# Patient Record
Sex: FEMALE | Race: BLACK OR AFRICAN AMERICAN | Employment: FULL TIME | URBAN - METROPOLITAN AREA
[De-identification: names, ages, dates, MRNs, and addresses within clinical notes are randomized per-mention and may not be internally consistent; named-entity substitution may affect disease eponyms.]

---

## 2018-02-05 LAB
ANTIBODY SCREEN, EXTERNAL: NEGATIVE
CHLAMYDIA, EXTERNAL: NEGATIVE
GRBS, EXTERNAL: NEGATIVE
HBSAG, EXTERNAL: NEGATIVE
HCT, EXTERNAL: 39.1
HEPATITIS C AB,   EXT: NEGATIVE
HGB, EXTERNAL: 12.8
HIV, EXTERNAL: NEGATIVE
N. GONORRHEA, EXTERNAL: NEGATIVE
PLATELET CNT,   EXTERNAL: 315
RPR, EXTERNAL: NEGATIVE
RUBELLA, EXTERNAL: NORMAL
TYPE, ABO & RH, EXTERNAL: NORMAL

## 2018-08-21 LAB — GRBS, EXTERNAL: NEGATIVE

## 2018-09-11 ENCOUNTER — HOSPITAL ENCOUNTER (INPATIENT)
Age: 33
LOS: 1 days | Discharge: HOME | End: 2018-09-12
Attending: OBSTETRICS & GYNECOLOGY | Admitting: OBSTETRICS & GYNECOLOGY
Payer: COMMERCIAL

## 2018-09-11 PROBLEM — D25.9 LEIOMYOMA OF UTERUS: Status: ACTIVE | Noted: 2018-09-11

## 2018-09-11 PROBLEM — R63.8 INCREASED BMI: Status: ACTIVE | Noted: 2018-09-11

## 2018-09-11 PROBLEM — J45.909 ASTHMA: Status: ACTIVE | Noted: 2018-09-11

## 2018-09-11 PROBLEM — O09.899 PRIOR PREGNANCY COMPLICATED BY PIH, ANTEPARTUM: Status: ACTIVE | Noted: 2018-09-11

## 2018-09-11 LAB
ABO + RH BLD: NORMAL
BASOPHILS # BLD: 0 K/UL (ref 0–0.1)
BASOPHILS NFR BLD: 0 % (ref 0–2)
BLOOD GROUP ANTIBODIES SERPL: NORMAL
DIFFERENTIAL METHOD BLD: ABNORMAL
EOSINOPHIL # BLD: 0.2 K/UL (ref 0–0.4)
EOSINOPHIL NFR BLD: 2 % (ref 0–5)
ERYTHROCYTE [DISTWIDTH] IN BLOOD BY AUTOMATED COUNT: 14.5 % (ref 11.6–14.5)
HCT VFR BLD AUTO: 37.2 % (ref 35–45)
HGB BLD-MCNC: 12.7 G/DL (ref 12–16)
LYMPHOCYTES # BLD: 2.1 K/UL (ref 0.9–3.6)
LYMPHOCYTES NFR BLD: 17 % (ref 21–52)
MCH RBC QN AUTO: 27.9 PG (ref 24–34)
MCHC RBC AUTO-ENTMCNC: 34.1 G/DL (ref 31–37)
MCV RBC AUTO: 81.8 FL (ref 74–97)
MONOCYTES # BLD: 0.7 K/UL (ref 0.05–1.2)
MONOCYTES NFR BLD: 6 % (ref 3–10)
NEUTS SEG # BLD: 9.2 K/UL (ref 1.8–8)
NEUTS SEG NFR BLD: 75 % (ref 40–73)
PLATELET # BLD AUTO: 225 K/UL (ref 135–420)
PMV BLD AUTO: 11.1 FL (ref 9.2–11.8)
RBC # BLD AUTO: 4.55 M/UL (ref 4.2–5.3)
SPECIMEN EXP DATE BLD: NORMAL
WBC # BLD AUTO: 12.2 K/UL (ref 4.6–13.2)

## 2018-09-11 PROCEDURE — 86900 BLOOD TYPING SEROLOGIC ABO: CPT | Performed by: ADVANCED PRACTICE MIDWIFE

## 2018-09-11 PROCEDURE — 74011250637 HC RX REV CODE- 250/637: Performed by: ADVANCED PRACTICE MIDWIFE

## 2018-09-11 PROCEDURE — 75410000000 HC DELIVERY VAGINAL/SINGLE

## 2018-09-11 PROCEDURE — 0HQ9XZZ REPAIR PERINEUM SKIN, EXTERNAL APPROACH: ICD-10-PCS | Performed by: OBSTETRICS & GYNECOLOGY

## 2018-09-11 PROCEDURE — 75410000003 HC RECOV DEL/VAG/CSECN EA 0.5 HR

## 2018-09-11 PROCEDURE — 74011250636 HC RX REV CODE- 250/636: Performed by: ADVANCED PRACTICE MIDWIFE

## 2018-09-11 PROCEDURE — 4A1H74Z MONITORING OF PRODUCTS OF CONCEPTION, CARDIAC ELECTRICAL ACTIVITY, VIA NATURAL OR ARTIFICIAL OPENING: ICD-10-PCS | Performed by: OBSTETRICS & GYNECOLOGY

## 2018-09-11 PROCEDURE — 75410000002 HC LABOR FEE PER 1 HR

## 2018-09-11 PROCEDURE — 74011250637 HC RX REV CODE- 250/637: Performed by: OBSTETRICS & GYNECOLOGY

## 2018-09-11 PROCEDURE — 65270000029 HC RM PRIVATE

## 2018-09-11 PROCEDURE — 85025 COMPLETE CBC W/AUTO DIFF WBC: CPT | Performed by: ADVANCED PRACTICE MIDWIFE

## 2018-09-11 RX ORDER — SALICYLIC ACID
90 POWDER (GRAM) MISCELLANEOUS ONCE
Status: COMPLETED | OUTPATIENT
Start: 2018-09-11 | End: 2018-09-11

## 2018-09-11 RX ORDER — AMOXICILLIN 250 MG
1 CAPSULE ORAL
Status: CANCELLED | OUTPATIENT
Start: 2018-09-11

## 2018-09-11 RX ORDER — ACETAMINOPHEN 325 MG/1
650 TABLET ORAL
Status: DISCONTINUED | OUTPATIENT
Start: 2018-09-11 | End: 2018-09-12 | Stop reason: HOSPADM

## 2018-09-11 RX ORDER — METHYLERGONOVINE MALEATE 0.2 MG/ML
0.2 INJECTION INTRAVENOUS AS NEEDED
Status: DISCONTINUED | OUTPATIENT
Start: 2018-09-11 | End: 2018-09-11 | Stop reason: HOSPADM

## 2018-09-11 RX ORDER — AMOXICILLIN 250 MG
1 CAPSULE ORAL
Status: DISCONTINUED | OUTPATIENT
Start: 2018-09-11 | End: 2018-09-12 | Stop reason: HOSPADM

## 2018-09-11 RX ORDER — ACETAMINOPHEN 325 MG/1
650 TABLET ORAL
Status: CANCELLED | OUTPATIENT
Start: 2018-09-11

## 2018-09-11 RX ORDER — NALBUPHINE HYDROCHLORIDE 10 MG/ML
10 INJECTION, SOLUTION INTRAMUSCULAR; INTRAVENOUS; SUBCUTANEOUS
Status: DISCONTINUED | OUTPATIENT
Start: 2018-09-11 | End: 2018-09-11 | Stop reason: HOSPADM

## 2018-09-11 RX ORDER — OXYTOCIN/0.9 % SODIUM CHLORIDE 30/500 ML
0-25 PLASTIC BAG, INJECTION (ML) INTRAVENOUS
Status: DISCONTINUED | OUTPATIENT
Start: 2018-09-11 | End: 2018-09-11

## 2018-09-11 RX ORDER — MISOPROSTOL 200 UG/1
800 TABLET ORAL
Status: COMPLETED | OUTPATIENT
Start: 2018-09-11 | End: 2018-09-11

## 2018-09-11 RX ORDER — LIDOCAINE HYDROCHLORIDE 10 MG/ML
20 INJECTION, SOLUTION EPIDURAL; INFILTRATION; INTRACAUDAL; PERINEURAL AS NEEDED
Status: COMPLETED | OUTPATIENT
Start: 2018-09-11 | End: 2018-09-11

## 2018-09-11 RX ORDER — OXYTOCIN/RINGER'S LACTATE 20/1000 ML
125 PLASTIC BAG, INJECTION (ML) INTRAVENOUS CONTINUOUS
Status: DISCONTINUED | OUTPATIENT
Start: 2018-09-11 | End: 2018-09-11 | Stop reason: HOSPADM

## 2018-09-11 RX ORDER — PROMETHAZINE HYDROCHLORIDE 25 MG/ML
25 INJECTION, SOLUTION INTRAMUSCULAR; INTRAVENOUS
Status: DISCONTINUED | OUTPATIENT
Start: 2018-09-11 | End: 2018-09-12 | Stop reason: HOSPADM

## 2018-09-11 RX ORDER — OXYTOCIN/RINGER'S LACTATE 20/1000 ML
500 PLASTIC BAG, INJECTION (ML) INTRAVENOUS ONCE
Status: COMPLETED | OUTPATIENT
Start: 2018-09-11 | End: 2018-09-11

## 2018-09-11 RX ORDER — IBUPROFEN 400 MG/1
800 TABLET ORAL
Status: CANCELLED | OUTPATIENT
Start: 2018-09-11

## 2018-09-11 RX ORDER — SODIUM CHLORIDE, SODIUM LACTATE, POTASSIUM CHLORIDE, CALCIUM CHLORIDE 600; 310; 30; 20 MG/100ML; MG/100ML; MG/100ML; MG/100ML
125 INJECTION, SOLUTION INTRAVENOUS CONTINUOUS
Status: DISCONTINUED | OUTPATIENT
Start: 2018-09-11 | End: 2018-09-11 | Stop reason: HOSPADM

## 2018-09-11 RX ORDER — HYDROCORTISONE 25 MG/G
CREAM TOPICAL AS NEEDED
Status: DISCONTINUED | OUTPATIENT
Start: 2018-09-11 | End: 2018-09-12 | Stop reason: HOSPADM

## 2018-09-11 RX ORDER — IBUPROFEN 400 MG/1
800 TABLET ORAL
Status: DISCONTINUED | OUTPATIENT
Start: 2018-09-11 | End: 2018-09-12 | Stop reason: HOSPADM

## 2018-09-11 RX ORDER — TERBUTALINE SULFATE 1 MG/ML
0.25 INJECTION SUBCUTANEOUS
Status: DISCONTINUED | OUTPATIENT
Start: 2018-09-11 | End: 2018-09-11 | Stop reason: HOSPADM

## 2018-09-11 RX ORDER — PROMETHAZINE HYDROCHLORIDE 25 MG/ML
25 INJECTION, SOLUTION INTRAMUSCULAR; INTRAVENOUS
Status: CANCELLED | OUTPATIENT
Start: 2018-09-11

## 2018-09-11 RX ORDER — OXYCODONE AND ACETAMINOPHEN 5; 325 MG/1; MG/1
2 TABLET ORAL
Status: DISCONTINUED | OUTPATIENT
Start: 2018-09-11 | End: 2018-09-12 | Stop reason: HOSPADM

## 2018-09-11 RX ADMIN — IBUPROFEN 800 MG: 400 TABLET ORAL at 15:58

## 2018-09-11 RX ADMIN — Medication 10000 MILLI-UNITS/HR: at 05:15

## 2018-09-11 RX ADMIN — SODIUM CHLORIDE, SODIUM LACTATE, POTASSIUM CHLORIDE, AND CALCIUM CHLORIDE 125 ML/HR: 600; 310; 30; 20 INJECTION, SOLUTION INTRAVENOUS at 02:45

## 2018-09-11 RX ADMIN — Medication 5 ML: at 05:16

## 2018-09-11 RX ADMIN — MISOPROSTOL 400 MCG: 200 TABLET ORAL at 05:09

## 2018-09-11 RX ADMIN — OXYTOCIN 6 MILLI-UNITS/MIN: 10 INJECTION, SOLUTION INTRAMUSCULAR; INTRAVENOUS at 04:55

## 2018-09-11 RX ADMIN — OXYTOCIN 2 MILLI-UNITS/MIN: 10 INJECTION, SOLUTION INTRAMUSCULAR; INTRAVENOUS at 03:47

## 2018-09-11 RX ADMIN — CASTOR OIL 90 ML: 1 LIQUID ORAL at 04:48

## 2018-09-11 RX ADMIN — IBUPROFEN 800 MG: 400 TABLET ORAL at 07:49

## 2018-09-11 NOTE — PROGRESS NOTES
0700 Received report from [id number] Emma Masters rn to oncoming nurse william Knapp Encouraged rest and shower this afternoon, baby at bedside in fathers arms. Declines any needs. 1130 Patient resting declines any needs.

## 2018-09-11 NOTE — L&D DELIVERY NOTE
Delivery Summary  Called to room by RN for pt reporting urge to push. AROM clear fluid, 9.5/100/0. Maternal pushing efforts initially inadequate to move baby, pitocin started to augment labor. Spontaneous, controlled vaginal birth of vigorous male infant. Head delivered CHRIS position, no nuchal cord, and clear fluid. Nuchal hand noted at posterior. Body delivered without difficulty. Atkinson to mothers abdomen. After placental autotransfusion, cord was doubly clamped and cut by father. Placenta delivered spontaneously and intact via Ruddy Weir, with 3-vessel, centrally inserted cord. IV Pitocin and cytotec 400mcg MA administered. Fundus firm, with minimal bleeding. Perineum and vagina inspected and found to have first degree perineal laceration repaired under local anesthesia with 3-0 vicryl suture and resulting in good hemostasis. EBL 350cc. Mother and infant stable, skin to skin, recovering in LDR. HERIBERTO Orourke present at birth. Dr. Ananda Mello notified of this birth. Patient: Jamar Randall MRN: [id number]  SSN: [id number]    YOB: 1985  Age: 28 y.o.   Sex: female        Labor Events:    Labor: No    Rupture Date: 2018    Rupture Time: 3:23 AM    Rupture Type: AROM    Amniotic Fluid Volume:       Amniotic Fluid Description:  Amniotic Fluid Odor: Clear          Induction: None         Induction Date:        Induction Time:       Indications for Induction:       Augmentation: None    Augmentation Date:      Augmentation Time:      Indications for Augmentation:      Cervical Ripening:       None    Rupture Identifier:       Labor complications: None     Additional complications:           Delivery Events:  Episiotomy: None    Indications for Episiotomy:      Laceration(s): First degree perineal       Repaired: Yes     Number of Repair Packets: 1    Suture Type and Size: Vicryl 3-0        Estimated Blood Loss (ml): 350        Information for the patient's :  REN Jaramillo Alethea [[id number]]     Delivery Summary - Baby    Delivery Date: 2018   Delivery Time: 4:53 AM   Delivery Type: Vaginal, Spontaneous Delivery  Sex:  male  Gestational Age: 38w3d  Delivery Clinician:  Lai Bustillo  Living?: Living   Delivery Location: L&D             APGARS  One minute Five minutes Ten minutes   Skin Color: 0    1       Heart Rate: 2   2         Reflex Irritability: 2   2         Muscle Tone: 2   2       Respiration: 2   2         Total: 8   9           Presentation: Vertex  Position:   Occiput Anterior  Resuscitation Method:  Tactile Stimulation     Meconium Stained: None    Cord Information: 3 Vessels   Complications: None  Cord Blood Sent?:  Yes    Blood Gases Sent?:  No    Placenta:  Date/Time:   5:13 AM  Removal: Spontaneous      Appearance: Normal      Measurements:  Birth Weight:      Birth Length:     Head Circumference:       Chest Circumference:      Abdominal Girth:       Other Providers:   BIA BLOOD;GABBY POLK;Elizabeth COLEMAN Primary Nurse;Primary  Nurse;Midwife;Nursery Nurse           Cord Blood Results:  Information for the patient's :  REN Jaramillo [[id number]]   No results found for: Duane De La Cruz, PCTDIG, BILI, ABORHEXT, [address]    Information for the patient's :  REN Jaramillo [[id number]]   No results found for: APH, APCO2, APO2, AHCO3, ABEC, ABDC, O2ST, SITE, RSCOM, PHI, PCO2I, PO2I, HCO3I, SO2I, IBD     Information for the patient's :  REN Jaramillo [[id number]]   No results found for: EPHV, PCO2V, PO2V, HCO3V, O2STV, EBDV

## 2018-09-11 NOTE — H&P
History & Physical 
 
Name: Mathieu Dumont MRN: [id number]  SSN: [id number] YOB: 1985  Age: 28 y.o. Sex: female Subjective:  
 
Estimated Date of Delivery: None noted. OB History  Para Term  AB Living  
 1 SAB TAB Ectopic Molar Multiple Live Births OB HISTORY Alethea presents with contractions which started yesterday morning and became more intense overnight. She denies LOF or VB. +FM. She desires an epidural in labor. Ms. Veronika Strickland is admitted with pregnancy at Unknown for active labor. Prenatal course was complicated by increased BMI and hx of PIH. She was induced in her first pregnancy at [address] [facility], and after an 18 hour labor, had VAD. Prenatal care has been followed by [facility] [address] [place] [address] starting at 7+6wga. Total wt gain 23lbs. Admitted to [phone]. No past medical history on file. No past surgical history on file. Social History Occupational History  Not on file. Social History Main Topics  Smoking status: Never Smoker  Smokeless tobacco: Not on file  Alcohol use No  
 Drug use: No  
 Sexual activity: Not on file No family history on file. No Known Allergies Prior to Admission medications Not on File Review of Systems: Pertinent items are noted in HPI. Objective:  
 
Vitals: There were no vitals filed for this visit. Physical Exam: 
Patient in no acute distress Abdomen: Gravid, nontender Cervix: 8/100 %/-2/  
FHR:Baseline: 135 per minute Variability: moderate Accelerations: no 
Decelerations: none Contractions: Every 3 minutes EFW 8.5 # by Leopold's 
 
Prenatal Labs:  
A pos, Neg prenatal panel. Group B Strep was negative. Assessment/Plan:  
Assessment: IUP @ 39+3wga; FHT Category I; Vertex presentation Patient Active Problem List  
Diagnosis Code  Labor and delivery indication for care or intervention O75.9  
 Increased BMI R63.8  Prior pregnancy complicated by [facility], antepartum O09.899  Asthma J45.909  Leiomyoma of uterus D25.9 Plan: Admit for Labor. Continue expectant management, Continue plan for vaginal delivery. Epidural as desired. Anticipate . Dr. Ros Hwang notified. Signed By:  Valentina Gaviria CNM  2018 2:50 AM

## 2018-09-11 NOTE — LACTATION NOTE
Dad states mom has tried twice to nurse baby. Mom in restroom. Instructed dad on nursing pattern/expectations. Baby is about 8 hours old. Mom did not nurse her first child. Offered help with feedings, encouraged to call when baby ready to eat. Info sheet, daily log and resource list given.

## 2018-09-11 NOTE — IP AVS SNAPSHOT
[address] Patient: Alfonzo Morales MRN: [id number] About your hospitalization You were admitted on:  September 11, 2018 You last received care in the:  [place] [address] You were discharged on:  September 12, 2018 Why you were hospitalized Your primary diagnosis was:  Labor And Delivery Indication For Care Or Intervention Your diagnoses also included:  Increased Bmi, Prior Pregnancy Complicated By Pih, Antepartum, Asthma, Leiomyoma Of Uterus Follow-up Information Follow up With Details Comments Contact Info Melissa Dodd II, MD   [address] [place] [address] 220 [facility] [phone] 
[phone] Discharge Orders None A check remington indicates which time of day the medication should be taken. My Medications START taking these medications Instructions Each Dose to Equal  
 Morning Noon Evening Bedtime  
 hydrocortisone 2.5 % rectal cream  
Commonly known as:  ANUSOL-HC Your last dose was: Your next dose is:    
   
   
 Apply externally 4 x /day as needed. ibuprofen 800 mg tablet Commonly known as:  MOTRIN Your last dose was: Your next dose is: Take 1 Tab by mouth every eight (8) hours as needed. 800 mg CONTINUE taking these medications Instructions Each Dose to Equal  
 Morning Noon Evening Bedtime PRENATAL AD PO Your last dose was: Your next dose is: Take 1 Tab by mouth daily. 1 Tab Where to Get Your Medications Information on where to get these meds will be given to you by the nurse or doctor. ! Ask your nurse or doctor about these medications  
  hydrocortisone 2.5 % rectal cream  
 ibuprofen 800 mg tablet Discharge Instructions CONGRATULATIONS ON THE BIRTH OF YOUR BABY! The first six weeks after childbirth is a time of physical and emotional adjustment. This handout will help to answer questions and provide guidance during the postpartum period. Every family's adjustment is unique, so please call if you have further concerns. At anytime we can be reached at [phone]. During office hours please ask to speak to a charge nurse. After hours, the answering service will take a message and the Nurse-Midwife on-call will return your call. If your question can wait until office hours: Monday-Friday 8:30-4:00, please do so. For emergencies or urgent concerns do not hesitate to call us after hours. DIET Your body is in need of a well-balanced, high protein diet to recuperate from birth. Please continue to take your prenatal vitamins for 6 weeks or as long as you are breastfeeding. Continue to drink at least 6-8 cups of water or other liquid a day. A breastfeeding mother also needs extra protein, calories and calcium containing foods. It is a good rule to drink fluids with every feeding in order to maintain an adequate milk supply and avoid dehydration. Your baby will probably not be bothered by things in your diet, but if the baby seems extremely fussy or develops a rash, you may want to discuss possible food intolerances with your baby's care provider. PAIN MEDICATIONS Acetaminophen (Tylenol), ibuprofen (Motrin), or other prescribed pain medication may be taken as directed to relieve discomfort. The above medications pass in very minimal amounts into the breast milk and usually will not cause problems. There are medications that may affect the baby, so please consult your baby's care provider before taking medication. If you are breastfeeding, be sure to mention this to any care provider you see so that medications that are safe may be selected.   There is an excellent resource called [employer] that is a resource for medication safety in pregnancy and lactation. You can visit their website at Bettyvision/ or call them toll free at [phone] if you have any questions about medication safety. UTERINE INVOLUTION / VAGINAL BLEEDING Involution is the process of the uterus returning to pre-pregnant size. It will take approximately six weeks for this process to occur. To achieve this size your uterus becomes firm to slow bleeding loss from the placental site. The first 7 days after birth, the bleeding is red and heavy. It may change with your activity and position. Some small clots are normal.   After ten days, the bleeding should be pale pink and slowed considerably. The next several weeks may progress to a pink, mucousy discharge. This may continue for 6-8 weeks, depending on your activity. During the first four weeks after delivery we recommend using sanitary pads instead of tampons. Douching should also be avoided, but it is fine to take a tub bath so long as the tub is very clean. ACTIVITY/EXERCISE Adequate rest is essential to recovery. Try to rest or sleep when the baby sleeps. After two weeks, you may begin going for short walks, doing Kegel exercises and abdominal crunches. Avoid heavy, jarring or aerobic exercises. Remember to start out slowly and build up to your previous fitness level. Use common sense and don't overdo as rest is important and the benefits of increased rest are a quicker recovery. For the first two weeks after a  try to limit trips up or down steps. Do not lift anything heavier than the baby during this time. Lifting the baby or other objects should be done by bending at the knees rather than the waist.  Driving should be avoided during the first two to three weeks until you have the strength to push firmly on the brakes in case of an emergency.   You may ride as a passenger, but DO wear a seat belt at all times. After a few weeks, you may resume normal activity at whatever pace is comfortable for you. Exercise may also be resumed gradually. Walking is a good way to start. Finally, try to be reasonable in your expectations. Caring for a new baby after major surgery can be quite trying. Arrange for assistance at home to ensure that you get enough rest.  
 
POSTPARTUM CHECK You may call the office when you return home to set up a postpartum visit. Most patients will be seen at 6 weeks after delivery, but after a  or other circumstances you may be seen in 2 weeks or less. If you are discharged from the hospital with staples that must be removed, you will be asked to come in sooner. At your postpartum visit, a pelvic exam may be performed. If you are having any problems or concerns, please do not hesitate to call. Once again our number is [phone]. MOOD CHANGES Significant hormonal changes occur in the days following delivery, and as a result, many women experience brief episodes of tearfulness or feeling \"blue. \"  These emotional swings may be made worse by lack of sleep and by the adjustments inherent in becoming a mother. For some women, these fluctuations are minor. For others, they are overwhelming; creating feelings of anxiety, depression, or the inability to cope. If you have difficulty functioning as a result of feeling down, or if the mood changes seem severe, do not improve, or result is thoughts of harming yourself or others CALL RIGHT AWAY. PERINEAL CARE The basic goals of perineal care are to prevent infection, to relieve pain and promote healing. Your stitches will dissolve in four to six weeks, and do not need to be removed. After urinating, please continue to clean with warm water from front to back. Please continue sitz baths as instructed twice a day for a week or as needed.   Call the office if you see pus in the suture site, or have unusual or severe swelling or pain that seems to be getting worse. INCISION CARE If you had a , clean and dry the incision gently as you would the rest of your body. Washing over the area with soap and water, and showering are fine. If steri-strips are present they will gradually come off with time. Tub baths are permitted. You may experience numbness and burning in the area surrounding the incision which usually resolves gradually over the next several weeks or months. RETURN OF MENSTRUATION Your first menstrual period may occur as soon as four to six weeks after your delivery if you are not breast-feeding. If breast-feeding it is more difficult to predict when your first period will occur. Even if you are not yet menstruating, you may be ovulating and it may be possible to conceive again. It is common for your first period after childbirth to be very heavy with an increased amount of cramping. BREASTS Breast-feeding Mothers: Colostrum is excreted in the first 24-72 hours. Mature breast milk will appear on the 2nd to 5th day. Engorgement may occur with the mature milk making your breasts feel warm and very full. Frequent feedings will make you more comfortable. Babies do not nurse on regular schedules. Nursing every 1 1/2 to 2 hours is normal and frequent feeding DOES NOT mean you are not making enough milk. To avoid nipple confusion, do not give bottles for the first 4 weeks. Growth spurts are common and may require more frequent feedings. This is the way baby increases your milk supply. During a growth spurt, you may feel you are feeding very frequently and that your breasts are \"empty. \"  Don't worry, your milk is produced by supply and demand so this increased frequency of feeding will increase your milk supply within 48 hours.   Sore nipples may occur with frequent feedings and are sometimes also caused by improper latch.  Check for a proper latch. Baby should have a wide open mouth. Use different positions at each feeding if possible. Express a small amount of colostrum or breast milk onto the sore area and leave bra flaps unlatched until dry. The lactation consultant at [facility] is available for outpatient consultation without charge. Call [phone] from Monday-Friday 9:00am- 3:00pm to arrange an outpatient appointment with her. [facility] and consultants may also be very helpful. If You Are Not Breast-feeding: You will experience swelling, engorgement and some milk production. There are no safe medications available to stop lactation. Some remedies for engorgement include: wearing a tight bra, ice packs and cold green cabbage leaves placed between the breast and your bra. Change these frequently. Tylenol or Motrin should help with the discomfort. SEXUAL ADJUSTMENTS We recommend that you wait at least four weeks before resuming sexual intercourse. A sore perineum, a demanding baby and fatigue will certainly affect your ability to enjoy lovemaking! A vaginal lubricant is recommended to help with any dryness. It is very important to remember that you will ovulate BEFORE your first period and can conceive. If you do not wish another pregnancy right away, please take precautions to avoid pregnancy. If you would like a prescription method of birth control, please discuss this with us at your 6 week visit. ELIMINATION We remind all postpartum patients that it may take a few days for your bowels to return to normal, especially if you had a long labor. For those who had C-sections or severe lacerations, we recommend that you use a stool softener twice daily for at least two weeks. Many stool softeners are over-the-counter. Colace (Docusate Sodium) is recommended.   Bulk forming agents such as Metamucil or Fibercon may be used daily in addition to a stool softener to promote regular bowel movements. Eating fresh fruits and vegetables along with whole grains is helpful as well. Do not be afraid to have a bowel movement as your stitches will not \"come out\" in the course of having a bowel movement. Urination may be difficult due to soreness around the urethra, or as an after effect of epidural.  This is temporary and can be helped  by squirting water over the perineum or try going in the shower. Hemorrhoids are common after birth. Tucks pads, Anusol cream and avoiding constipation are helpful. If constipation does occur, you may take Milk of Magnesia or Senekot according to the package instructions. DANGER SIGNS! CALL WITHOUT DELAY IF YOU ARE EXPERIENCING ANY OF THE FOLLOWING: 
* Unusually heavy bleeding, soaking more than 1 or more pads in an hour. * Vaginal discharge with strong foul odor. * Fever of 101 or higher * Unusual pain or tenderness in the abdominal area. * If breasts are red, hot or have a painful lump. * Depression that persists longer than 1-2 weeks or is severe. * Any urinary frequency accompanied by urgency or pain. * A lump in leg or calf especially if painful, warm or red. We thank you for choosing us for your prenatal care and/or delivery. We wish you all happiness and health with your baby for his or her lifetime! Jimi Godinez CNM Introducing \[facility] [place] [facility]\"" & HEALTH SERVICES! [employer] introduces Alum.ni patient portal. Now you can access parts of your medical record, email your doctor's office, and request medication refills online. 1. In your internet browser, go to [email or web address] 2. Click on the First Time User? Click Here link in the Sign In box. You will see the New Member Sign Up page. 3. Enter your Alum.ni Access Code exactly as it appears below. You will not need to use this code after youve completed the sign-up process.  If you do not sign up before the expiration date, you must request a new code. · K[employer]uya Access Code: [email or web address] Expires: 12/11/2018 12:35 PM 
 
4. Enter the last four digits of your Social Security Number (xxxx) and Date of Birth (mm/dd/yyyy) as indicated and click Submit. You will be taken to the next sign-up page. 5. Create a Kumbuya ID. This will be your Kumbuya login ID and cannot be changed, so think of one that is secure and easy to remember. 6. Create a Kumbuya password. You can change your password at any time. 7. Enter your Password Reset Question and Answer. This can be used at a later time if you forget your password. 8. Enter your e-mail address. You will receive e-mail notification when new information is available in [address] 9. Click Sign Up. You can now view and download portions of your medical record. 10. Click the Download Summary menu link to download a portable copy of your medical information. If you have questions, please visit the Frequently Asked Questions section of the Kumbuya website. Remember, Kumbuya is NOT to be used for urgent needs. For medical emergencies, dial 911. Now available from your iPhone and Android! Introducing Srinivas Serrano As a [facility] patient, I wanted to make you aware of our electronic visit tool called Srinivas Serrano. [facility] 24/7 allows you to connect within minutes with a medical provider 24 hours a day, seven days a week via a mobile device or tablet or logging into a secure website from your computer. You can access Srinivas Serrano from anywhere in the [place].  
 
A virtual visit might be right for you when you have a simple condition and feel like you just dont want to get out of bed, or cant get away from work for an appointment, when your regular [facility] provider is not available (evenings, weekends or holidays), or when youre out of town and need minor care. Electronic visits cost only $49 and if the [employer] provider determines a prescription is needed to treat your condition, one can be electronically transmitted to a nearby pharmacy*. Please take a moment to enroll today if you have not already done so. The enrollment process is free and takes just a few minutes. To enroll, please download the [employer] abraham to your tablet or phone, or visit [email or web address] to enroll on your computer. And, as an [address] [place] [address] patient with a TE[employer] account, the results of your visits will be scanned into your electronic medical record and your primary care provider will be able to view the scanned results. We urge you to continue to see your regular Nehemias Saenz provider for your ongoing medical care. And while your primary care provider may not be the one available when you seek a [employer] virtual visit, the peace of mind you get from getting a real diagnosis real time can be priceless. For more information on [employer], view our Frequently Asked Questions (FAQs) at [email or web address]. Sincerely, 
 
Owen Joshi MD 
Chief Medical Officer [address] *:  certain medications cannot be prescribed via [employer] Providers Seen During Your Hospitalization Provider Specialty Primary office phone Le Velez MD Obstetrics & Gynecology [phone] Your Primary Care Physician (PCP) Primary Care Physician Office Phone Office Fax Paulina Queen [phone] You are allergic to the following No active allergies Recent Documentation Height Weight Breastfeeding? BMI OB Status Smoking Status 1.702 m 133.8 kg Unknown 46.2 kg/m2 Recent pregnancy Never Smoker Emergency Contacts Name Discharge Info Relation Home Work Mobile [place] CAREGIVER [3] Spouse [3]   [phone] Patient Belongings The following personal items are in your possession at time of discharge: 
  Dental Appliances: None  Visual Aid: Glasses      Home Medications: None   Jewelry: None  Clothing: Dress, Footwear, Undergarments    Other Valuables: Jenaro Seymour Discharge Instructions Attachments/References DEPRESSION: POSTPARTUM (ENGLISH) PARENTING: STRESS AND INFANTS (ENGLISH) POSTPARTUM (ENGLISH) BABY-FRIENDLY BREASTFEEDING: GENERAL INFO (ENGLISH) FEEDING: : PEDIATRIC (ENGLISH) Patient Handouts Depression After Childbirth: Care Instructions Your Care Instructions Many women get the \"baby blues\" during the first few days after childbirth. You may lose sleep, feel irritable, and cry easily. You may feel happy one minute and sad the next. Hormone changes are one cause of these emotional changes. Also, the demands of a new baby, along with visits from relatives or other family needs, add to a mother's stress. The \"baby blues\" often peak around the fourth day. Then they ease up in less than 2 weeks. If your moodiness or anxiety lasts for more than 2 weeks, or if you feel like life is not worth living, you may have postpartum depression. This is different for each mother. Some mothers with serious depression may worry intensely about their infant's well-being. Others may feel distant from their child. Some mothers might even feel that they might harm their baby. A mother may have signs of paranoia, wondering if someone is watching her. Depression is not a sign of weakness. It is a medical condition that requires treatment. Medicine and counseling often work well to reduce depression. Talk to your doctor about taking antidepressant medicine while breastfeeding. Follow-up care is a key part of your treatment and safety.  Be sure to make and go to all appointments, and call your doctor if you are having problems. It's also a good idea to know your test results and keep a list of the medicines you take. How do you know if you are depressed? With all the changes in your life, you may not know if you are depressed. Pregnancy sometimes causes changes in how you feel that are similar to the symptoms of depression. Symptoms of depression include: · Feeling sad or hopeless and losing interest in daily activities. These are the most common symptoms of depression. · Sleeping too much or not enough. · Feeling tired. You may feel as if you have no energy. · Eating too much or too little. · Writing or talking about death, such as writing suicide notes or talking about guns, knives, or pills. Keep the numbers for these national suicide hotlines: 6-469-826-TALK (3-814.670.5615) and 8-169-LNGEBLX (7-111.279.8131). If you or someone you know talks about suicide or feeling hopeless, get help right away. How can you care for yourself at home? · Be safe with medicines. Take your medicines exactly as prescribed. Call your doctor if you think you are having a problem with your medicine. · Eat a healthy diet so that you can keep up your energy. · Get regular daily exercise, such as walks, to help improve your mood. · Get as much sunlight as possible. Keep your shades and curtains open. Get outside as much as you can. · Avoid using alcohol or other substances to feel better. · Get as much rest and sleep as possible. Avoid doing too much. Being too tired can increase depression. · Play stimulating music throughout your day and soothing music at night. · Schedule outings and visits with friends and family. Ask them to call you regularly, so that you do not feel alone. · Ask for help with preparing food and other daily tasks. Family and friends are often happy to help a mother with a . · Be honest with yourself and those who care about you. Tell them about your struggle. · Join a support group of new mothers. No one can better understand the challenges of caring for a  than other new mothers. · If you feel like life is not worth living or are feeling hopeless, get help right away. Keep the numbers for these national suicide hotlines: 6-764-128-TALK (3-457.856.6348) and 9-186-GPLXXPZ (1-691.176.6465). When should you call for help? Call 911 anytime you think you may need emergency care. For example, call if: 
  · You feel you cannot stop from hurting yourself, your baby, or someone else.  
[facility] your doctor now or seek immediate medical care if: 
  · You are having trouble caring for yourself or your baby.  
  · You hear voices.  
Darline Enamorado closely for changes in your health, and be sure to contact your doctor if: 
  · You have problems with your depression medicine.  
  · You do not get better as expected. Where can you learn more? Go to http://denilson-qiana.info/. Enter A576 in the search box to learn more about \"Depression After Childbirth: Care Instructions. \" Current as of: 2017 Content Version: 11.7 © 9541-1477 [employer]. Care instructions adapted under license by [employer] (which disclaims liability or warranty for this information). If you have questions about a medical condition or this instruction, always ask your healthcare professional. [place] [address] any warranty or liability for your use of this information. Stress in Parents of Infants: Care Instructions Your Care Instructions Meeting the increased demands of being a new parent can be a big challenge. It is easy to get overtired and overwhelmed during the first weeks. What used to be a simple chore, such as buying groceries, is not so simple now. Plus, you have new chores, including feeding and changing your new baby.  At the end of the day, you may be so tired that you feel like crying. Instead of looking forward to the next day, you may be dreading tomorrow. Like many new parents, you are burned out from the stress of having a new baby. Stress affects each of us differently, and the most effective ways to relieve it are different for each person. You can try different methods to find out which ones work best for you. As the weeks go by, you will begin to develop a rhythm with your baby. Tasks that now seem to take forever will become easier. Many women get the \"baby blues\" during the first few days after childbirth. If you are a new mother and the \"baby blues\" last more than a few days, call your doctor right away. Depression is a medical condition that requires treatment. Follow-up care is a key part of your treatment and safety. Be sure to make and go to all appointments, and call your doctor if you are having problems. It's also a good idea to know your test results and keep a list of the medicines you take. How can you care for yourself at home? · Be kind to yourself. Your new baby takes a lot of work, but he or she can give you a lot of pleasure too. Do not worry about housekeeping for a while. · Allow your friends to bring you meals or do chores. · Limit visitors to as few as you feel you can handle, or ask them not to visit for a while. Before they come, set a limit on how long they will stay. · Sleep when your baby sleeps. Even a short nap helps. · Find what triggers your stress, and avoid those things as much as you can. MCS comment for future enhancement · If you breastfeed, learn how to collect and store some breast milk so your partner or  can feed the baby while you sleep. · Eat a balanced diet so you can keep up your energy. · Drink plenty of fluids throughout the day. · Avoid caffeine and alcohol. Caffeine is found in coffee, tea, cola drinks, chocolate, and other foods.  
· Limit medicines that can make you more tired, such as tranquilizers and cold and allergy medicines. · Get regular daily exercise, such as walks, to help improve your mood. Rest after you exercise. · Be honest with yourself and those who care about you. Tell them you are stressed and tired. · Talking to other new parents can help. Ask your doctor or child's doctor to suggest support groups for new parents. Hearing that someone else is having the same experiences you are can help a lot. · If you have the baby blues for more than a few days, call your doctor right away. When should you call for help? Call 911 anytime you think you may need emergency care. For example, call if: 
  · You have thoughts of hurting yourself, your baby, or another person.  
[facility] your doctor now or seek immediate medical care if: 
  · You are having trouble caring for yourself or your baby.  
[place] closely for changes in your health, and be sure to contact your doctor if you have any problems. Where can you learn more? Go to http://denilson-qiana.info/. Enter H142 in the search box to learn more about \"Stress in Parents of Infants: Care Instructions. \" Current as of: May 12, 2017 Content Version: 11.7 © 8262-0170 [employer]. Care instructions adapted under license by [employer] (which disclaims liability or warranty for this information). If you have questions about a medical condition or this instruction, always ask your healthcare professional. [place] [address] any warranty or liability for your use of this information. After Your Delivery (the Postpartum Period): Care Instructions Your Care Instructions Congratulations on the birth of your baby. Like pregnancy, the  period can be a time of excitement, chu, and exhaustion. You may look at your wondrous little baby and feel happy. You may also be overwhelmed by your new sleep hours and new responsibilities. At first, babies often sleep during the days and are awake at night. They do not have a pattern or routine. They may make sudden gasps, jerk themselves awake, or look like they have crossed eyes. These are all normal, and they may even make you smile. In these first weeks after delivery, try to take good care of yourself. It may take 4 to 6 weeks to feel like yourself again, and possibly longer if you had a  birth. You will likely feel very tired for several weeks. Your days will be full of ups and downs, but lots of chu as well. Follow-up care is a key part of your treatment and safety. Be sure to make and go to all appointments, and call your doctor if you are having problems. It's also a good idea to know your test results and keep a list of the medicines you take. How can you care for yourself at home? Take care of your body after delivery · Use pads instead of tampons for the bloody flow that may last as long as 2 weeks. · Ease cramps with ibuprofen (Advil, Motrin). · Ease soreness of hemorrhoids and the area between your vagina and rectum with ice compresses or witch hazel pads. · Ease constipation by drinking lots of fluid and eating high-fiber foods. Ask your doctor about over-the-counter stool softeners. · Cleanse yourself with a gentle squeeze of warm water from a bottle instead of wiping with toilet paper. · Take a sitz bath in warm water several times a day. · Wear a good nursing bra. Ease sore and swollen breasts with warm, wet washcloths. · If you are not breastfeeding, use ice rather than heat for breast soreness. · Your period may not start for several months if you are breastfeeding. You may bleed more, and longer at first, than you did before you got pregnant. · Wait until you are healed (about 4 to 6 weeks) before you have sexual intercourse. Your doctor will tell you when it is okay to have sex. · Try not to travel with your baby for 5 or 6 weeks.  If you take a long car trip, make frequent stops to walk around and stretch. Avoid exhaustion · Rest every day. Try to nap when your baby naps. · Ask another adult to be with you for a few days after delivery. · Plan for  if you have other children. · Stay flexible so you can eat at odd hours and sleep when you need to. Both you and your baby are making new schedules. · Plan small trips to get out of the house. Change can make you feel less tired. · Ask for help with housework, cooking, and shopping. Remind yourself that your job is to care for your baby. Know about help for postpartum depression · \"Baby blues\" are common for the first 1 to 2 weeks after birth. You may cry or feel sad or irritable for no reason. · Rest whenever you can. Being tired makes it harder to handle your emotions. · Go for walks with your baby. · Talk to your partner, friends, and family about your feelings. · If your symptoms last for more than a few weeks, or if you feel very depressed, ask your doctor for help. · Postpartum depression can be treated. Support groups and counseling can help. Sometimes medicine can also help. Stay healthy · Eat healthy foods so you have more energy, make good breast milk, and lose extra baby pounds. · If you breastfeed, avoid alcohol and drugs. If you quit smoking during pregnancy, try to stay smoke-free. · Start daily exercise after 4 to 6 weeks, but rest when you feel tired. · Learn exercises to tone your belly. Do Kegel exercises to regain strength in your pelvic muscles. You can do these exercises while you stand or sit. ¨ Squeeze the same muscles you would use to stop your urine. Your belly and thighs should not move. ¨ Hold the squeeze for 3 seconds, and then relax for 3 seconds. ¨ Start with 3 seconds. Then add 1 second each week until you are able to squeeze for 10 seconds. ¨ Repeat the exercise 10 to 15 times for each session. Do three or more sessions each day. · Find a class for new mothers and new babies that has an exercise time. · If you had a  birth, give yourself a bit more time before you exercise, and be careful. When should you call for help? Call 911 anytime you think you may need emergency care. For example, call if: 
  · You passed out (lost consciousness).  
 Call your doctor now or seek immediate medical care if: 
  · You have severe vaginal bleeding. This means you are passing blood clots and soaking through a pad each hour for 2 or more hours.  
  · You are dizzy or lightheaded, or you feel like you may faint.  
  · You have a fever.  
  · You have new belly pain, or your pain gets worse.  
 Watch closely for changes in your health, and be sure to contact your doctor if: 
  · Your vaginal bleeding seems to be getting heavier.  
  · You have new or worse vaginal discharge.  
  · You feel sad, anxious, or hopeless for more than a few days.  
  · You do not get better as expected. Where can you learn more? Go to http://denilson-qiana.info/. Enter A461 in the search box to learn more about \"After Your Delivery (the Postpartum Period): Care Instructions. \" Current as of: 2017 Content Version: 11.7 © 1535-5277 [employer]. Care instructions adapted under license by [employer] (which disclaims liability or warranty for this information). If you have questions about a medical condition or this instruction, always ask your healthcare professional. [place] [address] any warranty or liability for your use of this information. Learning About Starting to Breastfeed Planning ahead Before your baby is born, plan ahead. Learn all you can about breastfeeding. This helps make breastfeeding easier. · Early in your pregnancy, talk to your doctor or midwife about breastfeeding. · Learn the basics before your baby is born.  The staff at hospitals and birthing centers can help you find a lactation specialist. This person is often a nurse who has been trained to teach and advise women about breastfeeding. Or you can take a breastfeeding class. · Plan ahead for times when you will need help after your baby is born. Many women get help from friends and family. Some join a support group to talk to other moms who breastfeed. · Buy the equipment you'll need. Examples are breast pads, nipple cream, extra pillows, and nursing bras. Find out about breast pumps too. Getting help from your hospital or birthing center It's important to have support from the doctors, nurses, and hospital staff who care for you and your baby. Before it's time for you to give birth, ask about the breastfeeding policies at your hospital or birthing center. Look for a hospital or birthing center that has policies for: · \"Rooming in. \" This policy encourages you to have your baby in the room with you. It can allow you to breastfeed more often. · Supplemental feedings. Tell the staff that your baby is to get only your breast milk from birth. If staff feed your baby water, sugar solution, or formula right after birth without a medical reason, it may make it harder for you to breastfeed. · Pacifiers or artificial nipples. Staff should not give your  these items without your permission. They may interfere with breastfeeding. · Follow-up. Find out if your hospital can help you with breastfeeding issues after you go home. See if you can get information on support groups or other contacts. They might help if you need help setting up and staying with your breastfeeding routine. Your first feeding It's best to start breastfeeding within 1 hour of birth. For each feeding, you go through these basic steps: · Get ready for the feeding. Be calm and relaxed, and try not to be distracted. Get some water or juice for yourself.  Use two or three pillows to help support your baby while he or she is nursing. · Find a breastfeeding position that is comfortable for you and your baby. Examples are the cradle and the football positions. Make sure the baby's head and chest are lined up straight and facing your breast. It's best to switch which breast you start with each time. · Get the baby latched on well. Your baby's mouth needs to be wide open, like a yawn, so you may need to gently touch the middle of your baby's lower lip. When your baby's mouth is open wide, quickly bring the baby onto your nipple and areola. The areola is the dark Lime around your nipple. · Provide a complete feeding. Let your baby nurse for at least 15 minutes. Be sure to burp your baby after each breast. 
In the first days after birth, your breasts make a thick, yellow liquid called colostrum. This liquid gives your baby nutrients and antibodies against infection. It is all that babies need at first. Your breasts will fill with milk a few days after the birth. Talk to your doctor, midwife, or lactation specialist right away if you are having problems and aren't sure what to do. How often to breastfeed Plan to breastfeed your baby on demand rather than setting a strict schedule. For the first few days, be prepared to breastfeed every 1 to 3 hours. That often works out to about 8 to 12 times in a 24-hour period. Wake a sleeping baby to feed, if you need to. If you breastfeed more often, it will help your breasts to produce more milk. After you go home After you're home, don't be afraid to call your doctor, midwife, or lactation specialist with questions. That's true even if you don't know what's bothering you. They are used to parents of newborns calling. They can help you figure out if there is a problem, and if so, how to fix it. Plan for times when you will be apart from your baby. Use a breast pump to collect breast milk ahead of time.  You can store milk in the refrigerator or freezer. Then it's ready when someone else will be taking care of your baby. Breastfeeding is a learned skill that gets easier over time. You are more likely to succeed if you plan ahead, learn the basic techniques, and know where to get help and support. Where can you learn more? Go to http://denilson-qiana.info/. Enter O984 in the search box to learn more about \"Learning About Starting to Breastfeed. \" Current as of: 2017 Content Version: 11.7 © 9745-1545 [employer]. Care instructions adapted under license by [employer] (which disclaims liability or warranty for this information). If you have questions about a medical condition or this instruction, always ask your healthcare professional. [address] any warranty or liability for your use of this information. Feeding Your [place]: Care Instructions Your Care Instructions Feeding a  is an important concern for parents. Experts recommend that newborns be fed on demand. This means that you breastfeed or bottle-feed your infant whenever he or she shows signs of hunger, rather than setting a strict schedule. Newborns follow their feelings of hunger. They eat when they are hungry and stop eating when they are full. Most experts also recommend breastfeeding for at least the first year. A common concern for parents is whether their baby is eating enough. Talk to your doctor if you are concerned about how much your baby is eating. Most newborns lose weight in the first several days after birth but regain it within a week or two. After 3weeks of age, your baby should continue to gain weight steadily. Follow-up care is a key part of your child's treatment and safety. Be sure to make and go to all appointments, and call your doctor if your child is having problems.  It's also a good idea to know your child's test results and keep a list of the medicines your child takes. How can you care for your child at home? · Allow your baby to feed on demand. ¨ During the first 2 weeks, these feedings occur every 1 to 3 hours (about 8 to 12 feedings in a 24-hour period) for  babies. These early feedings may last only a few minutes. Over time, feeding sessions will become longer and may happen less often. ¨ Formula-fed babies may have slightly fewer feedings, about 6 to 10 every 24 hours. They will eat about 2 to 3 ounces every 3 to 4 hours during the first few weeks of life. ¨ By 2 months, most babies have a set feeding routine. But your baby's routine may change at times, such as during growth spurts when your baby may be hungry more often. · You may have to wake a sleepy baby to feed in the first few days after birth. · Do not give any milk other than breast milk or infant formula until your baby is 1 year of age. Cow's milk, goat's milk, and soy milk do not have the nutrients that very young babies need to grow and develop properly. Cow and goat milk are very hard for young babies to digest. 
· Ask your doctor about giving a vitamin D supplement starting within the first few days after birth. · If you choose to switch your baby from the breast to bottle-feeding, try these tips. ¨ Try letting your baby drink from a bottle. Slowly reduce the number of times you breastfeed each day. For a week, replace a breastfeeding with a bottle-feeding during one of your daily feeding times. ¨ Each week, choose one more breastfeeding time to replace or shorten. ¨ Offer the bottle before each breastfeeding. When should you call for help? Watch closely for changes in your child's health, and be sure to contact your doctor if: 
  · You have questions about feeding your baby.  
  · You are concerned that your baby is not eating enough.  
  · You have trouble feeding your baby. Where can you learn more? Go to http://denilson-qiana.info/. Enter [phone] in the search box to learn more about \"Feeding Your : Care Instructions. \" Current as of: May 4, 2017 Content Version:  [facility], [employer]orporated. Care instructions adapted under license by [employer] (which disclaims liability or warranty for this information). If you have questions about a medical condition or this instruction, always ask your healthcare professional. [address] any warranty or liability for your use of this information. Please provide this summary of care documentation to your next provider. Signatures-by signing, you are acknowledging that this After Visit Summary has been reviewed with you and you have received a copy. Patient Signature:  ____________________________________________________________ Date:  ____________________________________________________________  
  
Juice John Provider Signature:  ____________________________________________________________ Date:  ____________________________________________________________

## 2018-09-11 NOTE — PROGRESS NOTES
Patient arrived to unit with c/o contractions all day, now increasing in frequency and in intensity. States no LOF, but has had some brown spotting. States + FM. Shown to room, changed into gown and urine sample obtained 06 8/-2 by [address] 
0320 patient states that she thinks she needs to push. Midwife notified and quickly came into room. 0327 AROM, clear fluid by [address] 
6802 starting pushing, nursery at bedside, primary nurse and [address] [place] [address] CN 
3832  of viable male infant by [address] 
0393 placenta; 1st degree laceration repaired by [place] Bullocks 
3036 fundus firm, at U, moderate bleeding. Cold pad applied to perineum 
0700 patient up to void x1; new gown, margarita pad and panties given.  Patient still states that she does not need any pain medication 
0723 report given to Tere Ayala RN

## 2018-09-12 VITALS
HEIGHT: 67 IN | BODY MASS INDEX: 45.99 KG/M2 | SYSTOLIC BLOOD PRESSURE: 110 MMHG | TEMPERATURE: 97.8 F | OXYGEN SATURATION: 100 % | RESPIRATION RATE: 17 BRPM | WEIGHT: 293 LBS | HEART RATE: 79 BPM | DIASTOLIC BLOOD PRESSURE: 78 MMHG

## 2018-09-12 LAB
HCT VFR BLD AUTO: 32.7 % (ref 35–45)
HGB BLD-MCNC: 11 G/DL (ref 12–16)

## 2018-09-12 PROCEDURE — 85018 HEMOGLOBIN: CPT | Performed by: OBSTETRICS & GYNECOLOGY

## 2018-09-12 PROCEDURE — 85014 HEMATOCRIT: CPT | Performed by: OBSTETRICS & GYNECOLOGY

## 2018-09-12 PROCEDURE — 74011250637 HC RX REV CODE- 250/637: Performed by: OBSTETRICS & GYNECOLOGY

## 2018-09-12 PROCEDURE — 36415 COLL VENOUS BLD VENIPUNCTURE: CPT | Performed by: OBSTETRICS & GYNECOLOGY

## 2018-09-12 RX ORDER — HYDROCORTISONE 25 MG/G
CREAM TOPICAL
Qty: 30 G | Refills: 0 | Status: SHIPPED | OUTPATIENT
Start: 2018-09-12 | End: 2020-11-10

## 2018-09-12 RX ORDER — IBUPROFEN 800 MG/1
800 TABLET ORAL
Qty: 60 TAB | Refills: 1 | Status: SHIPPED | OUTPATIENT
Start: 2018-09-12 | End: 2020-11-10

## 2018-09-12 RX ADMIN — HYDROCORTISONE 2.5%: 25 CREAM TOPICAL at 07:55

## 2018-09-12 RX ADMIN — IBUPROFEN 800 MG: 400 TABLET ORAL at 15:33

## 2018-09-12 RX ADMIN — IBUPROFEN 800 MG: 400 TABLET ORAL at 07:55

## 2018-09-12 RX ADMIN — WITCH HAZEL 1 PAD: 500 SOLUTION RECTAL; TOPICAL at 07:55

## 2018-09-12 NOTE — PROGRESS NOTES
Verbal shift change report given to AIDEE Maciel RN (oncoming nurse) by YAZAN Reyez RN (offgoing nurse). Report included the following information Intake/Output, MAR, Recent Results and Med Rec Status. 0800- vitals stable. discussed plan of care with patient. Discussed patients blood pressure history. Patient denies headache blurry vision and pain. Patient states readiness for discharge. 1300- patient in room eating, states readiness for discharge. Denies any needs at this time 1510- discharge instructions given to mom for herself and baby. Patient states understanding and denies any further questions or problems at this time. Verified ID bands, cut off tags and signed paperwork. 1735- patient discharged home via wheel chair with baby in car seat carried down stairs by dad.

## 2018-09-12 NOTE — LACTATION NOTE
Mom states baby is nursing well, prefers left side. Discussed side preferences, positions, latch, milk coming in, hindmilk, wet/dirty diapers. Encouraged to call with questions.

## 2018-09-12 NOTE — PROGRESS NOTES
Visited with mother, offered congratulations & assurance of prayer. Obtained permission to announce baby's name. Kaushik Rodríguez  Extern [facility] 
[phone]

## 2018-09-12 NOTE — DISCHARGE INSTRUCTIONS

## 2018-09-12 NOTE — ADT AUTH CERT NOTES
Patient Demographics     
  Patient Name [address] Sex  Address Phone    
  [facility] [phone] Female 1985 [address] [phone] (Home) [phone] (Mobile)    
   
  CSN:    
  [id number]    
   
  Admit Date: Admit Time Room Bed    
  Sep 11, 2018  2:24 AM 3409 [[id number]] 01 [[id number]]    
   
  Attending Providers     
  Provider Pager From To    
  Medhat Meyers MD  18 Delivery Date: 2018 Delivery Time: 4:53 AM  
Delivery Type: Vaginal, Spontaneous Delivery Sex:  male Gestational Age: 38w3d 
  
 Measurements: 
Birth Weight: 3.955 kg   
Birth Length: 21\"   
  
  
Delivery Clinician:  Darlene Piedra?:  
Delivery Location: L&D

## 2018-09-12 NOTE — PROGRESS NOTES
Assumed care of this patient. SBAR Report received from [facility] which included procedure summary, MAR, KARDEX and current plan of care. Bedside introductions given and white board updated. Pt and spouse verbalize understanding 2030 Assessment complete. All findings WNL 
2112 family bonding well 
667 3122 Infant taken to nursery for 24 hour testing. Id bands checked and verified with mother. 0530 Infant returned to mother from nursery ID bands checked and verified with patient. 0700 Relinquished care of patient. SBAR report given to oncoming shift which included procedure summary, MAR and Kardex.

## 2018-09-12 NOTE — DISCHARGE SUMMARY
Obstetrical Discharge Summary       [facility]  [address] [facility]  [address] [facility]  [phone]        Patient ID:Alethea Jaramillo,[id number],33 y.o.,1985    Postpartum Day: Information for the patient's :  Judy Cardona [[id number]]   2 days       Admit Date: 2018    Discharge Date: 2018     Admitting Physician: Aundrea Lindsey MD     Admission Diagnoses: Labor and delivery indication for care or intervention    Discharge Diagnoses: same as above      Additional Diagnoses:Present on Admission:   Labor and delivery indication for care or intervention   Increased BMI   Prior pregnancy complicated by PIH, antepartum   Asthma   Leiomyoma of uterus       Patient Active Problem List   Diagnosis Code    Labor and delivery indication for care or intervention O75.9    Increased BMI R63.8    Prior pregnancy complicated by PIH, antepartum O09.899    Asthma J45.909    Leiomyoma of uterus D25.9         Procedure:        Baby link  Information for the patient's newbornMaurie eBnny [[id number]]     Delivery Type: Vaginal, Spontaneous Delivery   Delivery Date: 2018   Delivery Time: 4:53 AM     Birth Weight: 3.955 kg     Sex:  male  Delivery Clinician:  Levi Tang   Gestational Age: Gestational Age: 38w3d    Presentation: Vertex   Position:    Occiput  Anterior     Apgars were 8  and 9      Resuscitation Method: Tactile Stimulation     Meconium Stained: None  Living Status: Living       Placenta Date/Time:   5:13 AM   Placenta Removal: Spontaneous   Placenta Appearance: Vertex [1]     Cord Information: 3 Vessels    Cord Events: None       Cord Blood Sent?:  Yes    Blood Gases Sent?:  No         Baby procedures:    Information for the patient's newbornMaurie Benny [[id number]]          Feeding Method: Infant Feeding: Breastmilk    Immunizations:   There is no immunization history for the selected administration types on file for this patient. Immunizations: There is no immunization history for the selected administration types on file for this patient. Group Beta Strep: GrBStrep, External   Date Value Ref Range Status   2018 Negative  Final          WBC   Date/Time Value Ref Range Status   2018 02:45 AM 12.2 4.6 - 13.2 K/uL Final   07/10/2012 08:40 AM 12.3 4.6 - 13.2 K/uL Final     HGB   Date/Time Value Ref Range Status   2018 05:20 AM 11.0 (L) 12.0 - 16.0 g/dL Final   2018 02:45 AM 12.7 12.0 - 16.0 g/dL Final   2012 05:30 AM 10.2 (L) 12.0 - 16.0 g/dL Final     PLATELET   Date/Time Value Ref Range Status   2018 02:45  135 - 420 K/uL Final     Hgb, External   Date/Time Value Ref Range Status   2018 12.8  Final     Platelet cnt., External   Date/Time Value Ref Range Status   2018 315  Final         Hospital Course: Unremarkable  Subjective:         Alethea desires discharge. Some c/io hemorrhoidal symptoms- will use Tucks and Anusol. Objective:   Breasts Nontender and intact  Fundus firm and involuting  Lochia rubra, minimal  Legs minimal to no edema and without pain.      Lab/Data Review:  Patient Vitals for the past 8 hrs:   BP Temp Pulse Resp SpO2   18 0339 120/72 98.5 °F (36.9 °C) 72 18 100 %       Temp (24hrs), Av.6 °F (37 °C), Min:98.5 °F (36.9 °C), Max:98.9 °F (37.2 °C)      WBC   Date/Time Value Ref Range Status   2018 02:45 AM 12.2 4.6 - 13.2 K/uL Final   07/10/2012 08:40 AM 12.3 4.6 - 13.2 K/uL Final     HGB   Date/Time Value Ref Range Status   2018 05:20 AM 11.0 (L) 12.0 - 16.0 g/dL Final   2018 02:45 AM 12.7 12.0 - 16.0 g/dL Final   2012 05:30 AM 10.2 (L) 12.0 - 16.0 g/dL Final     PLATELET   Date/Time Value Ref Range Status   2018 02:45  135 - 420 K/uL Final     Hgb, External   Date/Time Value Ref Range Status   2018 12.8  Final     Platelet cnt., External   Date/Time Value Ref Range Status 02/05/2018 315  Final     Patient Instructions:   Current Discharge Medication List      START taking these medications    Details   hydrocortisone (ANUSOL-HC) 2.5 % rectal cream Apply externally 4 x /day as needed. Qty: 30 g, Refills: 0      ibuprofen (MOTRIN) 800 mg tablet Take 1 Tab by mouth every eight (8) hours as needed. Qty: 60 Tab, Refills: 1         CONTINUE these medications which have NOT CHANGED    Details   prenatal vits15/iron/folic/dss (PRENATAL AD PO) Take 1 Tab by mouth daily. Assessment:     Status post: postpartum vaginal delivery   Recovering well  Breastfeeding  Mood Stable      Plan:     Postpartum care discussed including diet, ambulation,actvitiy restrictions, and mood fluctuations. Discharge instructions and questions answered for vaginal delivery.   Follow in 6 wks or prn      Signed By: Thu Neff CNM     September 12, 2018

## 2020-02-22 ENCOUNTER — HOSPITAL ENCOUNTER (OUTPATIENT)
Dept: MAMMOGRAPHY | Age: 35
Discharge: HOME | End: 2020-02-22
Attending: OBSTETRICS & GYNECOLOGY
Payer: COMMERCIAL

## 2020-02-22 DIAGNOSIS — Z12.31 VISIT FOR SCREENING MAMMOGRAM: ICD-10-CM

## 2020-02-22 PROCEDURE — 77063 BREAST TOMOSYNTHESIS BI: CPT

## 2021-06-14 ENCOUNTER — TRANSCRIBE ORDER (OUTPATIENT)
Dept: SCHEDULING | Age: 36
End: 2021-06-14

## 2021-06-14 DIAGNOSIS — Z12.31 VISIT FOR SCREENING MAMMOGRAM: Primary | ICD-10-CM

## 2021-07-10 ENCOUNTER — HOSPITAL ENCOUNTER (OUTPATIENT)
Dept: MAMMOGRAPHY | Age: 36
Discharge: HOME | End: 2021-07-10
Attending: OBSTETRICS & GYNECOLOGY
Payer: COMMERCIAL

## 2021-07-10 DIAGNOSIS — Z12.31 VISIT FOR SCREENING MAMMOGRAM: ICD-10-CM

## 2021-07-10 PROCEDURE — 77067 SCR MAMMO BI INCL CAD: CPT

## 2022-03-19 PROBLEM — R63.8 INCREASED BMI: Status: ACTIVE | Noted: 2018-09-11

## 2022-03-19 PROBLEM — D25.9 LEIOMYOMA OF UTERUS: Status: ACTIVE | Noted: 2018-09-11

## 2022-03-19 PROBLEM — O09.899 PRIOR PREGNANCY COMPLICATED BY PIH, ANTEPARTUM: Status: ACTIVE | Noted: 2018-09-11

## 2022-03-20 PROBLEM — J45.909 ASTHMA: Status: ACTIVE | Noted: 2018-09-11

## 2024-07-22 ENCOUNTER — TRANSCRIBE ORDERS (OUTPATIENT)
Facility: HOSPITAL | Age: 39
End: 2024-07-22

## 2024-07-22 DIAGNOSIS — Z12.31 SCREENING MAMMOGRAM FOR BREAST CANCER: Primary | ICD-10-CM

## 2024-08-20 ENCOUNTER — HOSPITAL ENCOUNTER (OUTPATIENT)
Facility: HOSPITAL | Age: 39
Discharge: HOME | End: 2024-08-23
Payer: COMMERCIAL

## 2024-08-20 VITALS — WEIGHT: 250.22 LBS | HEIGHT: 67 IN | BODY MASS INDEX: 39.27 KG/M2

## 2024-08-20 DIAGNOSIS — Z12.31 SCREENING MAMMOGRAM FOR BREAST CANCER: ICD-10-CM

## 2024-08-20 PROCEDURE — 77063 BREAST TOMOSYNTHESIS BI: CPT

## 2025-06-24 ENCOUNTER — TRANSCRIBE ORDERS (OUTPATIENT)
Facility: HOSPITAL | Age: 40
End: 2025-06-24

## 2025-06-24 DIAGNOSIS — Z12.31 VISIT FOR SCREENING MAMMOGRAM: Primary | ICD-10-CM

## 2025-08-21 ENCOUNTER — HOSPITAL ENCOUNTER (OUTPATIENT)
Facility: HOSPITAL | Age: 40
Discharge: HOME | End: 2025-08-24
Payer: COMMERCIAL

## 2025-08-21 VITALS — WEIGHT: 250.22 LBS | BODY MASS INDEX: 39.27 KG/M2 | HEIGHT: 67 IN

## 2025-08-21 DIAGNOSIS — Z12.31 VISIT FOR SCREENING MAMMOGRAM: ICD-10-CM

## 2025-08-21 PROCEDURE — 77063 BREAST TOMOSYNTHESIS BI: CPT
